# Patient Record
Sex: FEMALE | Race: OTHER | URBAN - METROPOLITAN AREA
[De-identification: names, ages, dates, MRNs, and addresses within clinical notes are randomized per-mention and may not be internally consistent; named-entity substitution may affect disease eponyms.]

---

## 2022-03-23 ENCOUNTER — NEW PATIENT (OUTPATIENT)
Dept: URBAN - METROPOLITAN AREA CLINIC 49 | Facility: CLINIC | Age: 57
End: 2022-03-23

## 2022-03-23 DIAGNOSIS — H25.13: ICD-10-CM

## 2022-03-23 DIAGNOSIS — H40.013: ICD-10-CM

## 2022-03-23 PROCEDURE — 92004 COMPRE OPH EXAM NEW PT 1/>: CPT

## 2022-03-23 PROCEDURE — 76514 ECHO EXAM OF EYE THICKNESS: CPT

## 2022-03-23 PROCEDURE — 92015 DETERMINE REFRACTIVE STATE: CPT

## 2022-03-23 ASSESSMENT — PACHYMETRY
OS_CT_UM: 579
OD_CT_UM: 575

## 2022-03-23 ASSESSMENT — VISUAL ACUITY
OS_GLARE: 20/50
OD_SC: 20/40
OD_CC: 20/25-2
OS_SC: J5@18"
OD_CC: J5@18"
OD_GLARE: 20/70
OS_CC: J7@18"
OS_GLARE: 20/30
OS_SC: 20/60
OS_CC: 20/40-1
OD_GLARE: 20/40

## 2022-03-23 ASSESSMENT — TONOMETRY
OD_IOP_MMHG: 21
OS_IOP_MMHG: 21
OD_IOP_MMHG: 23
OS_IOP_MMHG: 23

## 2022-03-23 NOTE — PATIENT DISCUSSION
Discussed with patient that her iop is borderline,  and she has optic nerve asymmetry.  It is recommended that patient schedule hvf/oct [id number].